# Patient Record
Sex: MALE | Race: WHITE | HISPANIC OR LATINO | Employment: FULL TIME | URBAN - METROPOLITAN AREA
[De-identification: names, ages, dates, MRNs, and addresses within clinical notes are randomized per-mention and may not be internally consistent; named-entity substitution may affect disease eponyms.]

---

## 2017-06-16 ENCOUNTER — HOSPITAL ENCOUNTER (EMERGENCY)
Facility: HOSPITAL | Age: 37
Discharge: HOME | End: 2017-06-16
Attending: EMERGENCY MEDICINE
Payer: COMMERCIAL

## 2017-06-16 VITALS
HEART RATE: 69 BPM | BODY MASS INDEX: 36.29 KG/M2 | HEIGHT: 69 IN | OXYGEN SATURATION: 97 % | DIASTOLIC BLOOD PRESSURE: 93 MMHG | WEIGHT: 245 LBS | TEMPERATURE: 98 F | SYSTOLIC BLOOD PRESSURE: 159 MMHG | RESPIRATION RATE: 20 BRPM

## 2017-06-16 DIAGNOSIS — L03.114 CELLULITIS OF LEFT UPPER EXTREMITY: Primary | ICD-10-CM

## 2017-06-16 DIAGNOSIS — M79.5 FOREIGN BODY IN SOFT TISSUE: ICD-10-CM

## 2017-06-16 DIAGNOSIS — F19.90 IV DRUG USER: ICD-10-CM

## 2017-06-16 DIAGNOSIS — Z23 TETANUS-DIPHTHERIA (TD) VACCINATION: ICD-10-CM

## 2017-06-16 PROCEDURE — 99283 EMERGENCY DEPT VISIT LOW MDM: CPT

## 2017-06-16 PROCEDURE — 63600175 PHARM REV CODE 636 W HCPCS: Performed by: NURSE PRACTITIONER

## 2017-06-16 PROCEDURE — 90471 IMMUNIZATION ADMIN: CPT | Performed by: NURSE PRACTITIONER

## 2017-06-16 PROCEDURE — 90715 TDAP VACCINE 7 YRS/> IM: CPT | Performed by: NURSE PRACTITIONER

## 2017-06-16 RX ORDER — BUPRENORPHINE HYDROCHLORIDE AND NALOXONE HYDROCHLORIDE DIHYDRATE 2; .5 MG/1; MG/1
TABLET SUBLINGUAL EVERY 6 HOURS PRN
COMMUNITY

## 2017-06-16 RX ORDER — LISINOPRIL 20 MG/1
20 TABLET ORAL DAILY
COMMUNITY

## 2017-06-16 RX ORDER — CEPHALEXIN 500 MG/1
500 CAPSULE ORAL 4 TIMES DAILY
Qty: 20 CAPSULE | Refills: 0 | Status: SHIPPED | OUTPATIENT
Start: 2017-06-16 | End: 2017-06-21

## 2017-06-16 RX ADMIN — CLOSTRIDIUM TETANI TOXOID ANTIGEN (FORMALDEHYDE INACTIVATED), CORYNEBACTERIUM DIPHTHERIAE TOXOID ANTIGEN (FORMALDEHYDE INACTIVATED), BORDETELLA PERTUSSIS TOXOID ANTIGEN (GLUTARALDEHYDE INACTIVATED), BORDETELLA PERTUSSIS FILAMENTOUS HEMAGGLUTININ ANTIGEN (FORMALDEHYDE INACTIVATED), BORDETELLA PERTUSSIS PERTACTIN ANTIGEN, AND BORDETELLA PERTUSSIS FIMBRIAE 2/3 ANTIGEN 0.5 ML: 5; 2; 2.5; 5; 3; 5 INJECTION, SUSPENSION INTRAMUSCULAR at 10:06

## 2017-06-16 NOTE — ED PROVIDER NOTES
"Encounter Date: 6/16/2017       History     Chief Complaint   Patient presents with    Wrist Pain     left wrist pain with some swelling to top of wrist, denies fall or injury     Review of patient's allergies indicates:  No Known Allergies  36-year-old male presents to the ED for evaluation of left wrist pain.  Patient reports that he was injecting IV heroin into his left wrist 2 days ago, when he noticed swelling several hours later.  He denies numbness/tingling, weakness, fever/chills, vomiting.  He reports that the swelling is improving, but he just needs "a work note" so that he can return to work as he missed yesterday and today.      The history is provided by the patient.   Arm Injury    The incident occurred two days ago. The incident occurred at home. Injury mechanism: IV drug use. The wounds were self-inflicted. Arm injury location: Left wrist. There have been no prior injuries to these areas. He is right-handed. His tetanus status is out of date. He has been behaving normally. He has received no recent medical care. He reports no foreign bodies present. Pertinent negatives include no chest pain, no numbness, no vomiting, no neck pain, no focal weakness, no loss of consciousness, no tingling, no weakness, no cough and no memory loss.     Past Medical History:   Diagnosis Date    Drug abuse     Hypertension      History reviewed. No pertinent surgical history.  History reviewed. No pertinent family history.  Social History   Substance Use Topics    Smoking status: Former Smoker    Smokeless tobacco: Not on file    Alcohol use Not on file     Review of Systems   Constitutional: Negative for chills and fever.   HENT: Negative for congestion.    Respiratory: Negative for cough and shortness of breath.    Cardiovascular: Negative for chest pain.   Gastrointestinal: Negative for vomiting.   Musculoskeletal: Positive for arthralgias. Negative for neck pain and neck stiffness.   Skin: Negative for rash and " wound.   Allergic/Immunologic: Negative for immunocompromised state.   Neurological: Negative for tingling, focal weakness, loss of consciousness, weakness and numbness.   Hematological: Does not bruise/bleed easily.   Psychiatric/Behavioral: Negative for confusion and memory loss.       Physical Exam     Initial Vitals [06/16/17 0923]   BP Pulse Resp Temp SpO2   (!) 159/93 69 20 98 °F (36.7 °C) 97 %     Physical Exam    Nursing note and vitals reviewed.  Constitutional: Vital signs are normal. He appears well-developed and well-nourished. He is active and cooperative. He is easily aroused.  Non-toxic appearance. He does not have a sickly appearance. He does not appear ill. No distress.   HENT:   Head: Normocephalic and atraumatic.   Eyes: Conjunctivae are normal.   Neck: Normal range of motion.   Cardiovascular: Normal rate.   Pulses:       Radial pulses are 2+ on the right side, and 2+ on the left side.   Pulmonary/Chest: Effort normal.   Abdominal: Soft. Normal appearance. There is no tenderness.   Musculoskeletal:        Left wrist: He exhibits swelling. He exhibits normal range of motion, no tenderness, no bony tenderness, no effusion, no crepitus, no deformity and no laceration.        Left forearm: Normal.        Left hand: Normal.   Lymphadenopathy:        Left: No epitrochlear adenopathy present.   Neurological: He is alert, oriented to person, place, and time and easily aroused. GCS eye subscore is 4. GCS verbal subscore is 5. GCS motor subscore is 6.   Skin: Skin is warm, dry and intact. No bruising, no ecchymosis, no rash and no abscess noted. There is erythema (very mild dorsal aspect left wrist).   Psychiatric: He has a normal mood and affect. His speech is normal and behavior is normal. Judgment and thought content normal. Cognition and memory are normal.         ED Course   Procedures  Labs Reviewed - No data to display     Imaging Results          X-Ray Wrist Complete Left (In process)                     Medical Decision Making:   Initial Assessment:   36-year-old male here for evaluation of left wrist pain after injecting IV heroin 2 days ago.  Patient reports the pain and swelling has improved, but he needs a note to return to work.  Patient denies foreign body.  No fever/chills, vomiting, weakness, numbness/tingling.  Patient appears well, nontoxic.  Dorsal aspect of left wrist with mild swelling and erythema.  Full nonpainful range of motion and strength of left wrist. No abscess.  Differential Diagnosis:   IV drug abuse, soft tissue foreign body, cellulitis, abscess  Clinical Tests:   Radiological Study: Ordered and Reviewed  ED Management:  X-ray left wrist, Adacel vaccination  I do not suspect septic joint as patient is afebrile, has full nonpainful active range of motion of the left wrist, and there is no tenderness to palpation.  No evidence of abscess.  I advised DC IV drug use.  X-ray negative for foreign body.  Rx Keflex for early mild cellulitis. No signs or symptoms of systemic infection.  F/u with PCP or [facility] within 2-3 days. Return to the ED if condition changes, progresses, or if there are concerns. Pt verbalized understanding, compliance, and agreement with treatment plan.   RX Keflex.                      ED Course     Clinical Impression:   The primary encounter diagnosis was Cellulitis of left upper extremity. Diagnoses of Foreign body in soft tissue, IV drug user, and Tetanus-diphtheria (Td) vaccination were also pertinent to this visit.          Berna Power, MADDISON  06/16/17 0005

## 2018-03-27 ENCOUNTER — HOSPITAL ENCOUNTER (EMERGENCY)
Facility: HOSPITAL | Age: 38
Discharge: HOME | End: 2018-03-28
Attending: EMERGENCY MEDICINE
Payer: COMMERCIAL

## 2018-03-27 DIAGNOSIS — K52.9 GASTROENTERITIS: Primary | ICD-10-CM

## 2018-03-27 PROCEDURE — 96361 HYDRATE IV INFUSION ADD-ON: CPT

## 2018-03-27 PROCEDURE — 99283 EMERGENCY DEPT VISIT LOW MDM: CPT | Mod: 25

## 2018-03-27 PROCEDURE — 96374 THER/PROPH/DIAG INJ IV PUSH: CPT

## 2018-03-27 RX ORDER — ONDANSETRON 2 MG/ML
4 INJECTION INTRAMUSCULAR; INTRAVENOUS
Status: COMPLETED | OUTPATIENT
Start: 2018-03-28 | End: 2018-03-27

## 2018-03-27 RX ADMIN — ONDANSETRON 4 MG: 2 INJECTION INTRAMUSCULAR; INTRAVENOUS at 11:03

## 2018-03-27 RX ADMIN — SODIUM CHLORIDE 1000 ML: 0.9 INJECTION, SOLUTION INTRAVENOUS at 11:03

## 2018-03-28 VITALS
DIASTOLIC BLOOD PRESSURE: 90 MMHG | TEMPERATURE: 98 F | RESPIRATION RATE: 14 BRPM | SYSTOLIC BLOOD PRESSURE: 168 MMHG | HEART RATE: 60 BPM | BODY MASS INDEX: 36.29 KG/M2 | WEIGHT: 245 LBS | OXYGEN SATURATION: 95 % | HEIGHT: 69 IN

## 2018-03-28 LAB
ALBUMIN SERPL BCP-MCNC: 3.8 G/DL
ALP SERPL-CCNC: 90 U/L
ALT SERPL W/O P-5'-P-CCNC: 72 U/L
ANION GAP SERPL CALC-SCNC: 8 MMOL/L
AST SERPL-CCNC: 51 U/L
BASOPHILS # BLD AUTO: 0.02 K/UL
BASOPHILS NFR BLD: 0.2 %
BILIRUB SERPL-MCNC: 0.6 MG/DL
BILIRUB UR QL STRIP: NEGATIVE
BUN SERPL-MCNC: 13 MG/DL
CALCIUM SERPL-MCNC: 9.3 MG/DL
CHLORIDE SERPL-SCNC: 106 MMOL/L
CLARITY UR: CLEAR
CO2 SERPL-SCNC: 28 MMOL/L
COLOR UR: YELLOW
CREAT SERPL-MCNC: 0.9 MG/DL
DIFFERENTIAL METHOD: NORMAL
EOSINOPHIL # BLD AUTO: 0.2 K/UL
EOSINOPHIL NFR BLD: 2.9 %
ERYTHROCYTE [DISTWIDTH] IN BLOOD BY AUTOMATED COUNT: 12.3 %
EST. GFR  (AFRICAN AMERICAN): >60 ML/MIN/1.73 M^2
EST. GFR  (NON AFRICAN AMERICAN): >60 ML/MIN/1.73 M^2
GLUCOSE SERPL-MCNC: 93 MG/DL
GLUCOSE UR QL STRIP: NEGATIVE
HCT VFR BLD AUTO: 44.7 %
HGB BLD-MCNC: 15 G/DL
HGB UR QL STRIP: NEGATIVE
KETONES UR QL STRIP: NEGATIVE
LEUKOCYTE ESTERASE UR QL STRIP: NEGATIVE
LIPASE SERPL-CCNC: 20 U/L
LYMPHOCYTES # BLD AUTO: 2 K/UL
LYMPHOCYTES NFR BLD: 25.2 %
MCH RBC QN AUTO: 30.1 PG
MCHC RBC AUTO-ENTMCNC: 33.6 G/DL
MCV RBC AUTO: 90 FL
MONOCYTES # BLD AUTO: 1 K/UL
MONOCYTES NFR BLD: 12 %
NEUTROPHILS # BLD AUTO: 4.8 K/UL
NEUTROPHILS NFR BLD: 59.6 %
NITRITE UR QL STRIP: NEGATIVE
PH UR STRIP: 6 [PH] (ref 5–8)
PLATELET # BLD AUTO: 285 K/UL
PMV BLD AUTO: 10.6 FL
POTASSIUM SERPL-SCNC: 4 MMOL/L
PROT SERPL-MCNC: 7.7 G/DL
PROT UR QL STRIP: NEGATIVE
RBC # BLD AUTO: 4.98 M/UL
SODIUM SERPL-SCNC: 142 MMOL/L
SP GR UR STRIP: 1.02 (ref 1–1.03)
URN SPEC COLLECT METH UR: NORMAL
UROBILINOGEN UR STRIP-ACNC: NEGATIVE EU/DL
WBC # BLD AUTO: 8.06 K/UL

## 2018-03-28 PROCEDURE — 25000003 PHARM REV CODE 250: Performed by: EMERGENCY MEDICINE

## 2018-03-28 PROCEDURE — 85025 COMPLETE CBC W/AUTO DIFF WBC: CPT

## 2018-03-28 PROCEDURE — 83690 ASSAY OF LIPASE: CPT

## 2018-03-28 PROCEDURE — 63600175 PHARM REV CODE 636 W HCPCS: Performed by: EMERGENCY MEDICINE

## 2018-03-28 PROCEDURE — 80053 COMPREHEN METABOLIC PANEL: CPT

## 2018-03-28 PROCEDURE — 81003 URINALYSIS AUTO W/O SCOPE: CPT

## 2018-03-28 RX ORDER — ONDANSETRON 4 MG/1
4 TABLET, ORALLY DISINTEGRATING ORAL EVERY 6 HOURS PRN
Qty: 12 TABLET | Refills: 0 | Status: SHIPPED | OUTPATIENT
Start: 2018-03-28

## 2020-07-15 ENCOUNTER — OCCUPATIONAL HEALTH (OUTPATIENT)
Dept: URGENT CARE | Facility: CLINIC | Age: 40
End: 2020-07-15
Payer: COMMERCIAL

## 2020-07-15 DIAGNOSIS — Z20.822 EXPOSURE TO COVID-19 VIRUS: Primary | ICD-10-CM

## 2020-07-15 PROCEDURE — U0003 INFECTIOUS AGENT DETECTION BY NUCLEIC ACID (DNA OR RNA); SEVERE ACUTE RESPIRATORY SYNDROME CORONAVIRUS 2 (SARS-COV-2) (CORONAVIRUS DISEASE [COVID-19]), AMPLIFIED PROBE TECHNIQUE, MAKING USE OF HIGH THROUGHPUT TECHNOLOGIES AS DESCRIBED BY CMS-2020-01-R: HCPCS

## 2020-07-17 LAB — SARS-COV-2 RNA RESP QL NAA+PROBE: NOT DETECTED

## 2020-07-18 ENCOUNTER — TELEPHONE (OUTPATIENT)
Dept: URGENT CARE | Facility: CLINIC | Age: 40
End: 2020-07-18

## 2020-07-18 NOTE — TELEPHONE ENCOUNTER
Discussed negative covid results with pt. Pt verbalized understanding and denied further questions.

## 2020-12-29 ENCOUNTER — OCCUPATIONAL HEALTH (OUTPATIENT)
Dept: URGENT CARE | Facility: CLINIC | Age: 40
End: 2020-12-29

## 2020-12-29 DIAGNOSIS — Z11.59 SCREENING FOR VIRAL DISEASE: Primary | ICD-10-CM

## 2020-12-29 LAB
CTP QC/QA: YES
SARS-COV-2 RDRP RESP QL NAA+PROBE: NEGATIVE

## 2020-12-29 PROCEDURE — U0002 COVID-19 LAB TEST NON-CDC: HCPCS | Mod: QW,S$GLB,, | Performed by: PHYSICIAN ASSISTANT

## 2020-12-29 PROCEDURE — U0002: ICD-10-PCS | Mod: QW,S$GLB,, | Performed by: PHYSICIAN ASSISTANT

## 2020-12-29 PROCEDURE — 99199 UNLISTED SPECIAL SVC PX/RPRT: CPT | Mod: S$GLB,,, | Performed by: PHYSICIAN ASSISTANT

## 2020-12-29 PROCEDURE — 99199 CV19 SPECIMEN HANDLING FEE / SWAB: ICD-10-PCS | Mod: S$GLB,,, | Performed by: PHYSICIAN ASSISTANT

## 2021-12-28 NOTE — ED PROVIDER NOTES
Continue to MONITOR CLOSELY to determine the need for TREATMENT and INCREASE/DECREASE in length of time till next follow up visit. Encounter Date: 3/27/2018    SCRIBE #1 NOTE: I, Raffi Bray, am scribing for, and in the presence of, Dr. Couch.       History     Chief Complaint   Patient presents with    Emesis     Patient presents to the ED with reports of having nausea, vomiting, and lightheadedness that started this morning. States having had approxiamtely x 5 episodes of vomiting today.     Carleen Mejía is a 37 y.o. male who  has a past medical history of Drug abuse and Hypertension.    The patient presents to the ED due to vomiting which which began this morning. Patient reports 5 episodes of vomiting today, most recently at 1500. He also notes diarrhea, nausea, and lightheadedness starting today as well. Patient did not take any medications for his symptoms.      The history is provided by the patient.     Review of patient's allergies indicates:  No Known Allergies  Past Medical History:   Diagnosis Date    Drug abuse     Hypertension      History reviewed. No pertinent surgical history.  History reviewed. No pertinent family history.  Social History   Substance Use Topics    Smoking status: Former Smoker    Smokeless tobacco: Never Used    Alcohol use No     Review of Systems   Gastrointestinal: Positive for diarrhea, nausea and vomiting.   Neurological: Positive for light-headedness.   All other systems reviewed and are negative.      Physical Exam     Initial Vitals [03/27/18 2246]   BP Pulse Resp Temp SpO2   (!) 164/97 67 18 98.3 °F (36.8 °C) 100 %      MAP       119.33         Physical Exam    Nursing note and vitals reviewed.  Constitutional: He appears well-developed and well-nourished.   HENT:   Head: Normocephalic and atraumatic.   Eyes: Conjunctivae are normal.   Neck: Neck supple.   Cardiovascular: Normal rate, regular rhythm, normal heart sounds and intact distal pulses. Exam reveals no gallop and no friction rub.    No murmur heard.  Pulmonary/Chest: Breath sounds normal. He has no wheezes. He has no  rhonchi. He has no rales.   Abdominal: Soft. He exhibits no distension. There is no tenderness.   Musculoskeletal: Normal range of motion.   Neurological: He is alert and oriented to person, place, and time.   Skin: Skin is warm and dry. No rash noted. No erythema.   Psychiatric: He has a normal mood and affect.         ED Course   Procedures  Labs Reviewed   COMPREHENSIVE METABOLIC PANEL - Abnormal; Notable for the following:        Result Value    AST 51 (*)     ALT 72 (*)     All other components within normal limits   CBC W/ AUTO DIFFERENTIAL   LIPASE   URINALYSIS             Medical Decision Making:   ED Management:  W/u wnl aside from very mildly elevated lft's. Will dc w zofran and f/u w pmd              Attending Attestation:           Physician Attestation for Scribe:  Physician Attestation Statement for Scribe #1: I, Fish Couch, reviewed documentation, as scribed by Raffi marie in my presence, and it is both accurate and complete.                    Clinical Impression:   The encounter diagnosis was Gastroenteritis.    Disposition:   Disposition: Discharged  Condition: Stable                        Fish Couch MD  03/28/18 0126

## 2023-02-16 NOTE — ED NOTES
Received a signout from previous physician.  CT scan resulted and patient does have severe ascites without any other acute findings.  I reevaluated the patient and he continues to deny any pain and appears to be breathing comfortably.  I do not believe that he needs to have an emergent paracentesis but I did reach out to IR who states that if the patient calls in the morning they should be able to schedule him for a paracentesis tomorrow.  He is afebrile normotensive and satting 100% on room air.  I believe that this is an acceptable treatment plan as I have low suspicion for infectious etiologies and his cirrhotic ascites appears to be a chronic issue.  Return precautions were discussed with patient.     Mook Shipman MD  02/15/23 5637     To ER with complaints of vomiting five times before 1500 today.  Pt states that he has been able to hold down fluids since 1500 today.  Pt request release to return to work on tomorrow.  Ortho static V/S obtained and water given for a fluid challenge.

## 2025-07-08 ENCOUNTER — OFFICE VISIT (OUTPATIENT)
Dept: CARDIOLOGY | Facility: CLINIC | Age: 45
End: 2025-07-08
Payer: COMMERCIAL

## 2025-07-08 DIAGNOSIS — R07.9 CHEST PAIN, UNSPECIFIED TYPE: ICD-10-CM

## 2025-07-08 DIAGNOSIS — R07.2 PRECORDIAL PAIN: Primary | ICD-10-CM

## 2025-07-08 DIAGNOSIS — I10 PRIMARY HYPERTENSION: ICD-10-CM

## 2025-07-08 PROCEDURE — 99999 PR PBB SHADOW E&M-NEW PATIENT-LVL III: CPT | Mod: PBBFAC,,, | Performed by: INTERNAL MEDICINE

## 2025-07-08 PROCEDURE — 99204 OFFICE O/P NEW MOD 45 MIN: CPT | Mod: S$GLB,,, | Performed by: INTERNAL MEDICINE

## 2025-07-08 PROCEDURE — 93000 ELECTROCARDIOGRAM COMPLETE: CPT | Mod: S$GLB,,, | Performed by: INTERNAL MEDICINE

## 2025-07-08 PROCEDURE — 1159F MED LIST DOCD IN RCRD: CPT | Mod: CPTII,S$GLB,, | Performed by: INTERNAL MEDICINE

## 2025-07-08 PROCEDURE — 4010F ACE/ARB THERAPY RXD/TAKEN: CPT | Mod: CPTII,S$GLB,, | Performed by: INTERNAL MEDICINE

## 2025-07-08 RX ORDER — METOPROLOL TARTRATE 50 MG/1
TABLET ORAL
Qty: 2 TABLET | Refills: 0 | Status: SHIPPED | OUTPATIENT
Start: 2025-07-08

## 2025-07-08 NOTE — PROGRESS NOTES
Subjective:   07/08/2025     Patient ID:  Ronald Mejía is a 44 y.o. male who presents for evaulation of Chest Pain       History of Present Illness    CHIEF COMPLAINT:  Patient reports chest pain occurring during physical activity, particularly during sexual activity and when working in heat.    HPI:  Patient reports chest pain in the left anterior chest area, initially manageable but worsening. Pain is exacerbated by working in heat, drinking coffee (particularly espresso), and most significantly, during sexual activity, especially at the point of ejaculation. He describes pain during sexual activity as significant and sharp, lasting for a few seconds to 1 minute. He also has slight dyspnea with the chest pain.    Pain suggests a need to reduce activity and has been intensifying over time. He has no prior heart problems or tests beyond BP measurements. He takes Lisinopril 20 mg daily for HTN management.    He recently obtained insurance and has started seeing Dr. Kashif Miller at [facility]. Prior to this, he was paying out of pocket for medical care and had not received extensive testing or bloodwork.    He denies chest pain at rest and a history of DM, although he notes it runs in his family on both sides.    CARDIAC HISTORY:  EKG ([id number]:00.000Z): normal (Dr. Smith)    MEDICATIONS:  Lisinopril 20 mg daily for HTN    FAMILY HISTORY:  Uncle (father's side): pacemaker Family history is significant for diabetes on both sides of the patient's family.    SOCIAL HISTORY:  Smokes marijuana  Denies alcohol use Occupation: Recently started working in heat      ROS:  General: -fever, -chills, -fatigue, -weight gain, -weight loss  Eyes: -vision changes, -redness, -discharge  ENT: -ear pain, -nasal congestion, -sore throat  Cardiovascular: +chest pain, -palpitations, -lower extremity edema, +chest pain with exertion  Respiratory: -cough, +shortness of breath  Gastrointestinal: -abdominal pain,  "-nausea, -vomiting, -diarrhea, -constipation, -blood in stool  Genitourinary: -dysuria, -hematuria, -frequency  Musculoskeletal: -joint pain, -muscle pain  Skin: -rash, -lesion  Neurological: -headache, -dizziness, -numbness, -tingling  Psychiatric: -anxiety, -depression, -sleep difficulty          Problem List[1]     Review of patient's allergies indicates:  No Known Allergies    Current Medications[2]     Objective:   Physical Exam    General: No acute distress. Well-developed. Well-nourished.  Eyes: EOMI. Sclerae anicteric.  HENT: Normocephalic. Atraumatic. Nares patent. Moist oral mucosa.  Cardiovascular: Regular rate. Regular rhythm. No murmurs. No rubs. No gallops. Normal S1, S2.  Respiratory: Normal respiratory effort. Clear to auscultation bilaterally. No rales. No rhonchi. No wheezing.  Musculoskeletal: No  obvious deformity.  Extremities: No lower extremity edema.  Neurological: Alert & oriented x3. No slurred speech. Normal gait.  Psychiatric: Normal mood. Normal affect. Good insight. Good judgment.  Skin: Warm. Dry. No rash.          Vitals:    07/08/25 0815   BP: (!) (P) 144/89   Pulse: (P) 72     Wt Readings from Last 3 Encounters:   07/08/25 (P) 100.4 kg (221 lb 5.5 oz)   03/27/18 111.1 kg (245 lb)   06/16/17 111.1 kg (245 lb)     Temp Readings from Last 3 Encounters:   03/28/18 97.8 °F (36.6 °C) (Oral)   06/16/17 98 °F (36.7 °C)     BP Readings from Last 3 Encounters:   07/08/25 (!) (P) 144/89   03/28/18 (!) 168/90   06/16/17 (!) 159/93     Pulse Readings from Last 3 Encounters:   07/08/25 (P) 72   03/28/18 60   06/16/17 69               No results found for: "CHOL"  No results found for: "HDL"  No results found for: "LDLCALC"  Lab Results   Component Value Date    ALT 72 (H) 03/28/2018    AST 51 (H) 03/28/2018     Lab Results   Component Value Date    CREATININE 0.9 03/28/2018    BUN 13 03/28/2018     03/28/2018    K 4.0 03/28/2018    CO2 28 03/28/2018     Lab Results   Component Value Date    " HGB 15.0 03/28/2018    HCT 44.7 03/28/2018       Assessment and Plan:   Assessment & Plan    R07.2 Precordial pain  I10 Primary hypertension  R07.9 Chest pain, unspecified type    IMPRESSION:  - Considered potential cardiac issues given family history of heart disease.    PRECORDIAL PAIN:  - Explained the CT procedure, including the need for medication to slow heart rate and importance of staying still during the test.  - Recommend CT Heart W Contrast to evaluate for potential heart artery stenosis.  - Ordered labs including CBC, metabolic profile, and cholesterol check.  - Follow up to schedule and complete labs before CT.  - Follow up to schedule CT after completing labs.    PRIMARY HYPERTENSION:  - Started metoprolol: Take 2 pills one hour before CT.  - Ordered labs including CBC, metabolic profile, and cholesterol check.    CHEST PAIN, UNSPECIFIED TYPE:  - Explained the CT procedure, including the need for medication to slow heart rate and importance of staying still during the test.  - Recommend CT Heart W Contrast to evaluate for potential heart artery stenosis.  - Ordered labs including CBC, metabolic profile, and cholesterol check.  - Follow up to schedule and complete labs before CT.  - Follow up to schedule CT after completing labs.          No follow-ups on file.        Future Appointments   Date Time Provider Department Center   7/14/2025  8:00 AM LAB, [facility] LABDRA [place]   7/16/2025  2:30 PM [facility]  CT1 [facility] CTSCAN [place]       This note was generated with the assistance of ambient listening technology. Verbal consent was obtained by the patient and accompanying visitor(s) for the recording of patient appointment to facilitate this note. I attest to having reviewed and edited the generated note for accuracy, though some syntax or spelling errors may persist. Please contact the author of this note for any clarification.                      [1] There is no problem list on file for this patient.    [2]   Current Outpatient Medications:     lisinopril (PRINIVIL,ZESTRIL) 20 MG tablet, Take 20 mg by mouth once daily., Disp: , Rfl:     buprenorphine-naloxone 2-0.5 mg (SUBOXONE) 2-0.5 mg Subl, Place under the tongue every 6 (six) hours as needed. (Patient not taking: Reported on 7/8/2025), Disp: , Rfl:     metoprolol tartrate (LOPRESSOR) 50 MG tablet, Take 2 tablets 1 hour prior to CT coronary angiogram, Disp: 2 tablet, Rfl: 0    ondansetron (ZOFRAN-ODT) 4 MG TbDL, Take 1 tablet (4 mg total) by mouth every 6 (six) hours as needed (vomiting). (Patient not taking: Reported on 7/8/2025), Disp: 12 tablet, Rfl: 0

## 2025-07-10 ENCOUNTER — PATIENT MESSAGE (OUTPATIENT)
Dept: CARDIOLOGY | Facility: HOSPITAL | Age: 45
End: 2025-07-10
Payer: COMMERCIAL

## 2025-07-10 ENCOUNTER — TELEPHONE (OUTPATIENT)
Dept: CARDIOLOGY | Facility: HOSPITAL | Age: 45
End: 2025-07-10
Payer: COMMERCIAL

## 2025-07-10 LAB
OHS QRS DURATION: 94 MS
OHS QTC CALCULATION: 452 MS

## 2025-07-10 NOTE — TELEPHONE ENCOUNTER
Attempted to call patient in regards to their upcoming Cardiac CTA scheduled for 07/16/2025 at 2:30.     Reminder for the patient to fast for 4-hours prior to the test, no caffeine the AM of, and can have water. Lab work is needed prior to this test and can be completed at your nearest [facility] facility as a walk-in with no appointment necessary.    For questions or concerns: I am available M-F 7:30-4, please call [phone]. Thank you!

## 2025-07-14 ENCOUNTER — PATIENT MESSAGE (OUTPATIENT)
Dept: CARDIOLOGY | Facility: HOSPITAL | Age: 45
End: 2025-07-14
Payer: COMMERCIAL

## 2025-07-14 ENCOUNTER — TELEPHONE (OUTPATIENT)
Dept: CARDIOLOGY | Facility: HOSPITAL | Age: 45
End: 2025-07-14
Payer: COMMERCIAL

## 2025-07-14 ENCOUNTER — LAB VISIT (OUTPATIENT)
Dept: LAB | Facility: HOSPITAL | Age: 45
End: 2025-07-14
Attending: INTERNAL MEDICINE
Payer: COMMERCIAL

## 2025-07-14 DIAGNOSIS — R07.2 PRECORDIAL PAIN: ICD-10-CM

## 2025-07-14 DIAGNOSIS — I10 PRIMARY HYPERTENSION: ICD-10-CM

## 2025-07-14 LAB
ABSOLUTE EOSINOPHIL (OHS): 0.44 K/UL
ABSOLUTE MONOCYTE (OHS): 0.77 K/UL (ref 0.3–1)
ABSOLUTE NEUTROPHIL COUNT (OHS): 3.65 K/UL (ref 1.8–7.7)
ALBUMIN SERPL BCP-MCNC: 4 G/DL (ref 3.5–5.2)
ALP SERPL-CCNC: 88 UNIT/L (ref 40–150)
ALT SERPL W/O P-5'-P-CCNC: 25 UNIT/L (ref 10–44)
ANION GAP (OHS): 8 MMOL/L (ref 8–16)
AST SERPL-CCNC: 23 UNIT/L (ref 11–45)
BASOPHILS # BLD AUTO: 0.06 K/UL
BASOPHILS NFR BLD AUTO: 0.9 %
BILIRUB SERPL-MCNC: 0.2 MG/DL (ref 0.1–1)
BUN SERPL-MCNC: 17 MG/DL (ref 6–20)
CALCIUM SERPL-MCNC: 8.9 MG/DL (ref 8.7–10.5)
CHLORIDE SERPL-SCNC: 105 MMOL/L (ref 95–110)
CHOLEST SERPL-MCNC: 148 MG/DL (ref 120–199)
CHOLEST/HDLC SERPL: 4.1 {RATIO} (ref 2–5)
CO2 SERPL-SCNC: 27 MMOL/L (ref 23–29)
CREAT SERPL-MCNC: 1 MG/DL (ref 0.5–1.4)
ERYTHROCYTE [DISTWIDTH] IN BLOOD BY AUTOMATED COUNT: 13.2 % (ref 11.5–14.5)
GFR SERPLBLD CREATININE-BSD FMLA CKD-EPI: >60 ML/MIN/1.73/M2
GLUCOSE SERPL-MCNC: 79 MG/DL (ref 70–110)
HCT VFR BLD AUTO: 43.4 % (ref 40–54)
HDLC SERPL-MCNC: 36 MG/DL (ref 40–75)
HDLC SERPL: 24.3 % (ref 20–50)
HGB BLD-MCNC: 14.3 GM/DL (ref 14–18)
IMM GRANULOCYTES # BLD AUTO: 0.02 K/UL (ref 0–0.04)
IMM GRANULOCYTES NFR BLD AUTO: 0.3 % (ref 0–0.5)
LDLC SERPL CALC-MCNC: 102.6 MG/DL (ref 63–159)
LYMPHOCYTES # BLD AUTO: 2.08 K/UL (ref 1–4.8)
MCH RBC QN AUTO: 30.4 PG (ref 27–31)
MCHC RBC AUTO-ENTMCNC: 32.9 G/DL (ref 32–36)
MCV RBC AUTO: 92 FL (ref 82–98)
NONHDLC SERPL-MCNC: 112 MG/DL
NUCLEATED RBC (/100WBC) (OHS): 0 /100 WBC
PLATELET # BLD AUTO: 353 K/UL (ref 150–450)
PMV BLD AUTO: 10.4 FL (ref 9.2–12.9)
POTASSIUM SERPL-SCNC: 3.7 MMOL/L (ref 3.5–5.1)
PROT SERPL-MCNC: 7.4 GM/DL (ref 6–8.4)
RBC # BLD AUTO: 4.7 M/UL (ref 4.6–6.2)
RELATIVE EOSINOPHIL (OHS): 6.3 %
RELATIVE LYMPHOCYTE (OHS): 29.6 % (ref 18–48)
RELATIVE MONOCYTE (OHS): 11 % (ref 4–15)
RELATIVE NEUTROPHIL (OHS): 51.9 % (ref 38–73)
SODIUM SERPL-SCNC: 140 MMOL/L (ref 136–145)
TRIGL SERPL-MCNC: 47 MG/DL (ref 30–150)
WBC # BLD AUTO: 7.02 K/UL (ref 3.9–12.7)

## 2025-07-14 PROCEDURE — 36415 COLL VENOUS BLD VENIPUNCTURE: CPT

## 2025-07-14 PROCEDURE — 82465 ASSAY BLD/SERUM CHOLESTEROL: CPT

## 2025-07-14 PROCEDURE — 85025 COMPLETE CBC W/AUTO DIFF WBC: CPT

## 2025-07-14 PROCEDURE — 84075 ASSAY ALKALINE PHOSPHATASE: CPT

## 2025-07-14 PROCEDURE — 82172 ASSAY OF APOLIPOPROTEIN: CPT

## 2025-07-14 NOTE — TELEPHONE ENCOUNTER
I had the pleasure of discussing your upcoming Cardiac CTA scheduled for 07/16/2025 at 2:30. To review, we discussed showing up 15-20 minutes before your appointment time. Your test is located at [facility] on the corner of [address] and [address], address [address], [place] [address], next door to [employer].     I have reviewed your current medications that you take and I've discussed the Cardiac CTA prep for heart rate management with Dr. Baires, the interpreting MD. He is ordering for you take 50mg of Metoprolol tartrate (Lopressor) 2-hours prior to the CTA. Again, the goal for this is to maintain a desired heart rate of ~60 beats/minute for the duration of the test--about 40 minutes. This helps for the overall study quality and clarity.    Please ensure to HOLD/SKIP your morning dose of Lisinopril (Prinivil, Zestril) the morning of the CTA and BRING it with you to the test in the event it is needed upon completion of the imaging.    Reminder for a 4-hour fasting time, no caffeine the AM of, and you can have water, anywhere from 16-32 ounces before and after completion of the test. It is advisable to have someone transport you to and from the test as a safety precaution. Thank you again for your time today. For any questions or concerns: I am available M-F from 7:30-4, please call [phone].    Billing dept: [phone]  Authorization line: [phone], option #3

## 2025-07-14 NOTE — TELEPHONE ENCOUNTER
Attempted to call patient in regards to their upcoming Cardiac CTA scheduled for 07/16/2025 at 2:30.     Reminder for the patient to fast for 4-hours prior to the test, no caffeine the AM of, and can have water.     For questions or concerns: I am available M-F 7:30-4, please call [phone]. Thank you!

## 2025-07-16 LAB — APO B SERPL-MCNC: 87 MG/DL
